# Patient Record
Sex: MALE | Race: WHITE | NOT HISPANIC OR LATINO | Employment: FULL TIME | URBAN - METROPOLITAN AREA
[De-identification: names, ages, dates, MRNs, and addresses within clinical notes are randomized per-mention and may not be internally consistent; named-entity substitution may affect disease eponyms.]

---

## 2017-09-23 ENCOUNTER — APPOINTMENT (EMERGENCY)
Dept: CT IMAGING | Facility: HOSPITAL | Age: 27
End: 2017-09-23
Payer: COMMERCIAL

## 2017-09-23 ENCOUNTER — HOSPITAL ENCOUNTER (EMERGENCY)
Facility: HOSPITAL | Age: 27
Discharge: HOME | End: 2017-09-23
Attending: EMERGENCY MEDICINE | Admitting: EMERGENCY MEDICINE
Payer: COMMERCIAL

## 2017-09-23 ENCOUNTER — APPOINTMENT (EMERGENCY)
Dept: RADIOLOGY | Facility: HOSPITAL | Age: 27
End: 2017-09-23
Payer: COMMERCIAL

## 2017-09-23 VITALS
WEIGHT: 170 LBS | HEART RATE: 106 BPM | SYSTOLIC BLOOD PRESSURE: 135 MMHG | BODY MASS INDEX: 21.14 KG/M2 | OXYGEN SATURATION: 97 % | DIASTOLIC BLOOD PRESSURE: 76 MMHG | HEIGHT: 75 IN | RESPIRATION RATE: 20 BRPM

## 2017-09-23 DIAGNOSIS — S40.211A: ICD-10-CM

## 2017-09-23 DIAGNOSIS — S90.811A: ICD-10-CM

## 2017-09-23 DIAGNOSIS — S30.811A ABRASION OF ABDOMINAL WALL, INITIAL ENCOUNTER: ICD-10-CM

## 2017-09-23 DIAGNOSIS — S42.031A DISPLACED FRACTURE OF LATERAL END OF RIGHT CLAVICLE, INITIAL ENCOUNTER FOR CLOSED FRACTURE: ICD-10-CM

## 2017-09-23 DIAGNOSIS — S60.519A: ICD-10-CM

## 2017-09-23 DIAGNOSIS — S80.211A ABRASION OF KNEE, RIGHT, INITIAL ENCOUNTER: ICD-10-CM
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
## 2017-09-23 LAB — ETHANOL SERPL-MCNC: 104 MG/DL (ref 0–3)

## 2017-09-23 PROCEDURE — 80320 DRUG SCREEN QUANTALCOHOLS: CPT | Performed by: EMERGENCY MEDICINE

## 2017-09-23 PROCEDURE — 99285 EMERGENCY DEPT VISIT HI MDM: CPT

## 2017-09-23 PROCEDURE — 73630 X-RAY EXAM OF FOOT: CPT

## 2017-09-23 PROCEDURE — 36415 COLL VENOUS BLD VENIPUNCTURE: CPT | Performed by: EMERGENCY MEDICINE

## 2017-09-23 PROCEDURE — 73030 X-RAY EXAM OF SHOULDER: CPT

## 2017-09-23 PROCEDURE — 73130 X-RAY EXAM OF HAND: CPT

## 2017-09-23 PROCEDURE — 70450 CT HEAD/BRAIN W/O DYE: CPT

## 2017-09-23 PROCEDURE — 96374 THER/PROPH/DIAG INJ IV PUSH: CPT

## 2017-09-23 PROCEDURE — 72125 CT NECK SPINE W/O DYE: CPT

## 2017-09-23 RX ORDER — HYDROCODONE BITARTRATE AND ACETAMINOPHEN 5; 325 MG/1; MG/1
1 TABLET ORAL EVERY 6 HOURS PRN
Qty: 12 TABLET | Refills: 0 | Status: SHIPPED | OUTPATIENT
Start: 2017-09-23 | End: 2017-09-26

## 2017-09-23 RX ORDER — GINSENG 100 MG
1 CAPSULE ORAL ONCE
Status: COMPLETED | OUTPATIENT
Start: 2017-09-23 | End: 2017-09-23

## 2017-09-23 RX ORDER — KETOROLAC TROMETHAMINE 30 MG/ML
15 INJECTION, SOLUTION INTRAMUSCULAR; INTRAVENOUS ONCE
Status: COMPLETED | OUTPATIENT
Start: 2017-09-23 | End: 2017-09-23

## 2017-09-23 RX ADMIN — KETOROLAC TROMETHAMINE 15 MG: 30 INJECTION, SOLUTION INTRAMUSCULAR at 05:32

## 2017-09-23 RX ADMIN — Medication 1 LARGE APPLICATION: at 05:34

## 2024-04-29 VITALS — BODY MASS INDEX: 21.14 KG/M2 | HEIGHT: 75 IN | WEIGHT: 170 LBS

## 2024-04-29 DIAGNOSIS — M25.511 RIGHT SHOULDER PAIN, UNSPECIFIED CHRONICITY: Primary | ICD-10-CM
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
## 2024-04-29 DIAGNOSIS — S46.211A STRAIN OF RIGHT BICEPS MUSCLE, INITIAL ENCOUNTER: ICD-10-CM

## 2024-04-29 DIAGNOSIS — Z98.890 HX OF SHOULDER SURGERY: ICD-10-CM

## 2024-04-29 PROCEDURE — 99203 OFFICE O/P NEW LOW 30 MIN: CPT | Performed by: FAMILY MEDICINE

## 2024-04-29 NOTE — PROGRESS NOTES
Assessment:     1. Right shoulder pain, unspecified chronicity  MRI shoulder right wo contrast      2. Hx of shoulder surgery  MRI shoulder right wo contrast      3. Work related injury  MRI shoulder right wo contrast      4. Strain of right biceps muscle, initial encounter  MRI shoulder right wo contrast        Orders Placed This Encounter   Procedures    MRI shoulder right wo contrast        Impression:   Right shoulder pain likely secondary to concerns for bicep strain versus partial rupture.    Date of injury: 04/18/2024  Mechanism of injury: lifting a heavy object   Follow-up from injury: 1 weeks and 5 days   Workmen's Compensation    Conservative Management   We discussed different treatment options:  Reviewed patient first documentation completed on 04/19/2024.  Treatment plan consisted of x-rays of the right shoulder which were normal.  As well as Robaxin and Voltaren.  Patient was referred to Ortho/sports medicine.  Ice or Heat Therapy as needed 1-2 times daily for 10-20 minutes. As tolerated.   Over the counter Tylenol and/or NSAIDs  as needed based off your Past Medical Hx. Please follow product label for dosing and maximum limits.    Trial of over the counter Topical Analgesics such as Lidocaine cream or Voltaren Gel, as tolerated. If skin becomes irritated, discontinue use.   Please range joint through gentle range of motion as tolerated.   Pending MRI   Work note. Limitations provided         Imaging   Reviewed prior xrays obtain in Urgent or Emergency Department.   Unable to review radiological images in office today.  Only able to review patient's radiology impression.  4/19/2024 right shoulder x-ray: No acute osseous abnormality  Pending right shoulder MRI     Procedure  Not appropriate at this time.     Shared decision making, patient agreeable to plan.      Return for Follow up after completion of advanced imaging.    HPI:   Ramesh Solo is a 34 y.o. male  who presents for evaluation of   Chief  Complaint   Patient presents with    Right Shoulder - Pain       Occupation: manual labor  Injury Related: Yes, Date of Injury: 04/18/2024    Onset/Mechanism: lifting heavy pipe had pain that worsened during the work day   Location: anterior shoulder.  Severity: Current severity: 0-0.5/10. Max severity: 8.5/10.  Pain described as:dull   Radiation: Will radiate down into the distal bicep..  Provocative: forward flexion, external rotation .  Associated symptoms: Denies numbness or tingling in hand.  Denies hearing a pop.  States he feels like he is limited in his range of motion    Denies any hx of fracture of affected limb.   HX of surgery of affected limb. Completed: 1.5-2 years ago.     history of right shoulder arthroscopic anterior labral repair and capsulorrhaphy, arthroscopic posterior labral repair, open subacromial decompression, open partial rotator cuff repair done on 9/22/2022 with Dr. Garcia   Post-surgery 80-90 % improved. Limited in external rotation after surgery without pain      Summary of treatment to-date:   Robaxin, Voltaren  No ice or heat therapy  Denies any topical gels.    Following History Reviewed and Updated   History reviewed. No pertinent past medical history.  History reviewed. No pertinent surgical history.  History reviewed. No pertinent family history.    Social History     Substance and Sexual Activity   Alcohol Use None     Social History     Substance and Sexual Activity   Drug Use Not on file     Social History     Tobacco Use   Smoking Status Never   Smokeless Tobacco Never       Social Determinants of Health     Tobacco Use: Low Risk  (4/29/2024)    Patient History     Smoking Tobacco Use: Never     Smokeless Tobacco Use: Never     Passive Exposure: Not on file   Alcohol Use: Not on file   Financial Resource Strain: Not on file   Food Insecurity: Not on file   Transportation Needs: Not on file   Physical Activity: Not on file   Stress: Not on file   Social Connections: Not on  "file   Intimate Partner Violence: Not on file   Depression: Not on file   Housing Stability: Not on file   Utilities: Not on file   Health Literacy: Not on file        No Known Allergies    Review of Systems      Review of Systems     Review of Systems   Constitutional: Negative for chills and fever.   HENT: Negative for drooling and sneezing.    Eyes: Negative for redness.   Respiratory: Negative for cough and wheezing.    Gastrointestinal: Negative for vomiting.   Psychiatric/Behavioral: Negative for behavioral problems. The patient is not nervous/anxious.      All other systems negative.   Physical Exam   Physical Exam    Vitals and nursing note reviewed.  Constitutional:   Appearance. Normal Appearance.  Ht 6' 3\" (1.905 m)   Wt 77.1 kg (170 lb)   BMI 21.25 kg/m²     Body mass index is 21.25 kg/m².   HENT:  Head: Atraumatic.  Nose: Nose normal  Eyes: Conjunctiva/sclera: Conjunctivae normal.  Cardiovascular:   Rate and Rhythm: Bilateral equal distal pulses  Pulmonary:   Effort: Pulmonary effort is normal  Skin:   General: Skin is warm and dry.  Neurological:   General: No focal deficit present.  Mental Status: Alert and oriented to person, place, and time.   Psychiatric:   Mood and Affect: mood normal.  Behavior: Behavior normal     Musculoskeletal Exam     Ortho Exam   Right shoulder:     INSPECTION:  Without srinath's deformity, bicep asymmetric compared to contralateral side   Erythema Swelling Ecchymosis Increased warmth   Negative Neg. Neg. Neg.     PALPATION/TENDERNESS:  Acromion Clavicle Scapula/spine of scapula AC joint   Negative Neg. Neg. Neg.     Subacromial bursa Long head of the biceps Coracoid process Trapezius/periscapular region   Neg. Positive . Neg. Neg.     RANGE OF MOTION:  C-Spine Flexion C-Spine Extension C-Spine Sidebending C-Spine Rotation    intact intact intact intact     Internal rotation in 90 degrees Abduction External rotation in 90 degrees Abduction Internal rotation in Adduction " "External rotation in Adduction     Lumbar spine Active: 40 degrees     Forward Flexion Abduction   Limited Limited     STRENGTH:      Okay Sign Finger abduction Thumb extension   Intact, bilaterally Intact, bilaterally Intact, bilaterally       ROTATOR CUFF:  Rotator cuff tear  Supraspinatus  Infraspinatus  Subscapularis    (Drop-Arm) (Empty can) (External rotation against resistance) (Belly press)   negative Discomfort without weakness Discomfort without weakness negative     IMPINGEMENT:  Neer's [place]-Jacinto   Clinical Equivocal     BICEPS TENDINOPATHY:  Resisted forward flexion  Resisted supination   (Speed's) (Yergason's):    Positive reproducing chief Discomfort at the proximal bicep     AC JOINT:  Forced cross body adduction (Scarf cross-arm) Job's AC compression   Negative N/a      LABRUM:  Townsend's: Labral Crank Test:    Negative N/a      APPREHENSION  Apprehension Jerry's Relocation Maneuver:    N/A N/A     Special test:  Spurlings    N/A         Distal Sensation  Radial Pulse   Intact Bilaterally  Present and Equal Bilaterally             Procedures       Portions of the record may have been created with voice recognition software. Occasional wrong word or \"sound alike\" substitutions may have occurred due to the inherent limitations of voice recognition software. Please review the chart carefully and recognize, using context, where substitutions/typographical errors may have occurred.   "

## 2024-04-29 NOTE — LETTER
April 29, 2024     Patient: Ramesh Solo  YOB: 1990  Date of Visit: 4/29/2024      To Whom it May Concern:    Ramesh Solo is under my professional care. Ramesh was seen in my office on 4/29/2024. Ramesh may return to work with limitations to the right upper extremity. .      No heavy lifting, pushing, pulling greater than 5 pounds.  No climbing ladders to height.    We will reevaluate in 2 weeks      If you have any questions or concerns, please don't hesitate to call.         Sincerely,          Michell Lyons,         CC: No Recipients

## 2024-05-01 ENCOUNTER — TELEPHONE (OUTPATIENT)
Age: 34
End: 2024-05-01

## 2024-05-01 NOTE — TELEPHONE ENCOUNTER
Caller: Ben Newsome from [facility]    Doctor: Rom Elizalde    Reason for call: electronically faxed 4/29 OVN and work status to fax # [phone]     Call back#: [phone]

## 2024-05-08 ENCOUNTER — HOSPITAL ENCOUNTER (OUTPATIENT)
Dept: MRI IMAGING | Facility: HOSPITAL | Age: 34
Discharge: HOME | End: 2024-05-08
Attending: FAMILY MEDICINE
Payer: OTHER MISCELLANEOUS

## 2024-05-08 ENCOUNTER — HOSPITAL ENCOUNTER (OUTPATIENT)
Dept: RADIOLOGY | Facility: HOSPITAL | Age: 34
Discharge: HOME | End: 2024-05-08

## 2024-05-08 DIAGNOSIS — Z98.890 HX OF SHOULDER SURGERY: ICD-10-CM

## 2024-05-08 DIAGNOSIS — S46.211A STRAIN OF RIGHT BICEPS MUSCLE, INITIAL ENCOUNTER: ICD-10-CM

## 2024-05-08 DIAGNOSIS — M79.5 FOREIGN BODY (FB) IN SOFT TISSUE: ICD-10-CM
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
## 2024-05-08 DIAGNOSIS — M25.511 RIGHT SHOULDER PAIN, UNSPECIFIED CHRONICITY: ICD-10-CM

## 2024-05-08 PROCEDURE — 73221 MRI JOINT UPR EXTREM W/O DYE: CPT

## 2024-05-31 ENCOUNTER — TELEPHONE (OUTPATIENT)
Age: 34
End: 2024-05-31

## 2024-05-31 NOTE — TELEPHONE ENCOUNTER
Caller: Patient    Doctor: Dr. Lyons    Reason for call: Patient was suppose to see Dr. Lyons for MRI review today but he messed up the time and was unable to attend.  Patient is wondering if there is any way Dr. Lyons can take a look at the MRI and give him a call.  He is scheduled in first f/u slot on 6/7/24.    Call back#: [phone]

## 2024-06-07 VITALS
BODY MASS INDEX: 21.14 KG/M2 | DIASTOLIC BLOOD PRESSURE: 82 MMHG | SYSTOLIC BLOOD PRESSURE: 128 MMHG | HEIGHT: 75 IN | WEIGHT: 170 LBS
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
## 2024-06-07 DIAGNOSIS — Z98.890 HX OF SHOULDER SURGERY: ICD-10-CM

## 2024-06-07 DIAGNOSIS — M25.511 RIGHT SHOULDER PAIN, UNSPECIFIED CHRONICITY: Primary | ICD-10-CM

## 2024-06-07 PROCEDURE — 99213 OFFICE O/P EST LOW 20 MIN: CPT | Performed by: FAMILY MEDICINE

## 2024-06-07 NOTE — LETTER
June 7, 2024     Patient: Ramesh Solo  YOB: 1990  Date of Visit: 6/7/2024      To Whom it May Concern:    Ramesh Solo is under my professional care. Ramesh was seen in my office on 6/7/2024. Ramesh may return to work on 06/07/2024 .    May return to full duty/no restrictions.     If you have any questions or concerns, please don't hesitate to call.         Sincerely,          Michell Lyons,         CC: No Recipients

## 2024-06-07 NOTE — PROGRESS NOTES
Assessment:     1. Right shoulder pain, unspecified chronicity        2. Hx of shoulder surgery        3. Work related injury          No orders of the defined types were placed in this encounter.       Impression:   Right shoulder pain likely secondary to concerns for bicep strain versus partial rupture.    Date of injury: 04/18/2024  Mechanism of injury: lifting a heavy object   Follow-up from injury: 8 weeks  Workmen's Compensation     Conservative Management   We discussed different treatment options:  Previously reviewed/discussed  Reviewed patient first documentation completed on 04/19/2024.  Treatment plan consisted of x-rays of the right shoulder which were normal.  As well as Robaxin and Voltaren.  Patient was referred to Ortho/sports medicine.  Ice or Heat Therapy as needed 1-2 times daily for 10-20 minutes. As tolerated.   Over the counter Tylenol and/or NSAIDs  as needed based off your Past Medical Hx. Please follow product label for dosing and maximum limits.    Trial of over the counter Topical Analgesics such as Lidocaine cream or Voltaren Gel, as tolerated. If skin becomes irritated, discontinue use.   Please range joint through gentle range of motion as tolerated.   Pending MRI   Work note. Limitations provided   Today's discussion/review  Reviewed MRI  Work note provided.  No limitations given.        Imaging   Reviewed prior xrays obtain in Urgent or Emergency Department.   Unable to review radiological images in office today.  Only able to review patient's radiology impression.  4/19/2024 right shoulder x-ray: No acute osseous abnormality  05/08/2024 right shoulder MRI  IMPRESSION:     Evidence of prior rotator cuff and labral repair, without new injur    Procedure  Not appropriate at this time.     Shared decision making, patient agreeable to plan.      No follow-ups on file.    HPI:   Ramesh Solo is a 34 y.o. male  who presents for evaluation of   Chief Complaint   Patient presents with     Right Shoulder - Follow-up     Today's visit  Denies any new trauma.  Back to patients baseline.  Patient is ready to return to full duty.      Previous visit 04/29/2024.  Occupation: manual labor  Injury Related: Yes, Date of Injury: 04/18/2024     Onset/Mechanism: lifting heavy pipe had pain that worsened during the work day   Location: anterior shoulder.  Severity: Current severity: 0-0.5/10. Max severity: 8.5/10.  Pain described as:dull   Radiation: Will radiate down into the distal bicep..  Provocative: forward flexion, external rotation .  Associated symptoms: Denies numbness or tingling in hand.  Denies hearing a pop.  States he feels like he is limited in his range of motion     Denies any hx of fracture of affected limb.   HX of surgery of affected limb. Completed: 1.5-2 years ago.      history of right shoulder arthroscopic anterior labral repair and capsulorrhaphy, arthroscopic posterior labral repair, open subacromial decompression, open partial rotator cuff repair done on 9/22/2022 with Dr. Garcia   Post-surgery 80-90 % improved. Limited in external rotation after surgery without pain       Summary of treatment to-date:   Robaxin Voltaren  No ice or heat therapy  Denies any topical gels.    Following History Reviewed and Updated   No past medical history on file.  No past surgical history on file.  No family history on file.    Social History     Substance and Sexual Activity   Alcohol Use None     Social History     Substance and Sexual Activity   Drug Use Not on file     Social History     Tobacco Use   Smoking Status Never   Smokeless Tobacco Never       Social Determinants of Health     Tobacco Use: Low Risk  (4/29/2024)    Patient History     Smoking Tobacco Use: Never     Smokeless Tobacco Use: Never     Passive Exposure: Not on file   Alcohol Use: Not on file   Financial Resource Strain: Not on file   Food Insecurity: Not on file   Transportation Needs: Not on file   Physical Activity: Not on  "file   Stress: Not on file   Social Connections: Not on file   Intimate Partner Violence: Not on file   Depression: Not on file   Housing Stability: Not on file   Utilities: Not on file   Health Literacy: Not on file        No Known Allergies    Review of Systems      Review of Systems     Review of Systems   Constitutional: Negative for chills and fever.   HENT: Negative for drooling and sneezing.    Eyes: Negative for redness.   Respiratory: Negative for cough and wheezing.    Gastrointestinal: Negative for vomiting.   Psychiatric/Behavioral: Negative for behavioral problems. The patient is not nervous/anxious.      All other systems negative.   Physical Exam   Physical Exam    Vitals and nursing note reviewed.  Constitutional:   Appearance. Normal Appearance.  Ht 6' 3\" (1.905 m)   Wt 77.1 kg (170 lb)   BMI 21.25 kg/m²     Body mass index is 21.25 kg/m².   HENT:  Head: Atraumatic.  Nose: Nose normal  Eyes: Conjunctiva/sclera: Conjunctivae normal.  Cardiovascular:   Rate and Rhythm: Bilateral equal distal pulses  Pulmonary:   Effort: Pulmonary effort is normal  Skin:   General: Skin is warm and dry.  Neurological:   General: No focal deficit present.  Mental Status: Alert and oriented to person, place, and time.   Psychiatric:   Mood and Affect: mood normal.  Behavior: Behavior normal     Musculoskeletal Exam     Ortho Exam       Right shoulder:      INSPECTION:    Gross deformity   Negative      PALPATION/TENDERNESS:  Acromion Clavicle Scapula/spine of scapula AC joint   Negative Neg. Neg. Neg.      Subacromial bursa Long head of the biceps Coracoid process Trapezius/periscapular region   Neg. Minimal discomfort. Neg. Neg.      RANGE OF MOTION:  C-Spine Flexion C-Spine Extension C-Spine Sidebending C-Spine Rotation    intact intact intact intact      Internal rotation in 90 degrees Abduction External rotation in 90 degrees Abduction Internal rotation in Adduction External rotation in Adduction       Lumbar spine " "Active: 20 degrees      Forward Flexion Abduction   Limited endrange Full      STRENGTH:        Okay Sign Finger abduction Thumb extension   Intact, bilaterally Intact, bilaterally Intact, bilaterally         ROTATOR CUFF:  Rotator cuff tear  Supraspinatus  Infraspinatus  Subscapularis    (Drop-Arm) (Empty can) (External rotation against resistance) (Belly press)   negative Discomfort without weakness Negative negative     BICEPS TENDINOPATHY:  Resisted forward flexion  Resisted supination   (Speed's) (Yergason's):    Resolved Discomfort at the proximal bicep      AC JOINT:  Forced cross body adduction (Scarf cross-arm) Job's AC compression    N/a       LABRUM:  Townsend's: Labral Crank Test:     N/a       APPREHENSION  Apprehension Jerry's Relocation Maneuver:    N/A N/A      Special test:  Spurlings    N/A            Distal Sensation  Radial Pulse   Intact Bilaterally  Present and Equal Bilaterally            Procedures       Portions of the record may have been created with voice recognition software. Occasional wrong word or \"sound alike\" substitutions may have occurred due to the inherent limitations of voice recognition software. Please review the chart carefully and recognize, using context, where substitutions/typographical errors may have occurred.   "